# Patient Record
Sex: FEMALE | URBAN - METROPOLITAN AREA
[De-identification: names, ages, dates, MRNs, and addresses within clinical notes are randomized per-mention and may not be internally consistent; named-entity substitution may affect disease eponyms.]

---

## 2019-03-05 ENCOUNTER — APPOINTMENT (RX ONLY)
Age: 24
Setting detail: DERMATOLOGY
End: 2019-03-05

## 2019-03-05 DIAGNOSIS — L20.89 OTHER ATOPIC DERMATITIS: ICD-10-CM

## 2019-03-05 DIAGNOSIS — L70.0 ACNE VULGARIS: ICD-10-CM

## 2019-03-05 PROCEDURE — 99203 OFFICE O/P NEW LOW 30 MIN: CPT

## 2019-03-05 PROCEDURE — ? OTHER

## 2019-03-05 PROCEDURE — ? COUNSELING

## 2019-03-05 PROCEDURE — ? PRESCRIPTION

## 2019-03-05 PROCEDURE — ? DIAGNOSIS COMMENT

## 2019-03-05 RX ORDER — CLOBETASOL PROPIONATE 0.5 MG/ML
SOLUTION TOPICAL
Qty: 1 | Refills: 1 | Status: ERX | COMMUNITY
Start: 2019-03-05

## 2019-03-05 RX ORDER — BETAMETHASONE DIPROPIONATE 0.5 MG/G
CREAM TOPICAL
Qty: 1 | Refills: 1 | Status: ERX | COMMUNITY
Start: 2019-03-05

## 2019-03-05 RX ORDER — TACROLIMUS 1 MG/G
OINTMENT TOPICAL
Qty: 1 | Refills: 3 | Status: ERX | COMMUNITY
Start: 2019-03-05

## 2019-03-05 RX ADMIN — CLOBETASOL PROPIONATE: 0.5 SOLUTION TOPICAL at 00:00

## 2019-03-05 RX ADMIN — BETAMETHASONE DIPROPIONATE: 0.5 CREAM TOPICAL at 00:00

## 2019-03-05 RX ADMIN — TACROLIMUS: 1 OINTMENT TOPICAL at 00:00

## 2019-03-05 NOTE — HPI: RASH
What Type Of Note Output Would You Prefer (Optional)?: Standard Output
How Severe Is Your Rash?: moderate
Is This A New Presentation, Or A Follow-Up?: Rash
Additional History: She did have the Nexplanon removed in October and switched to OCPs but does not think this triggered her rash. Her PCP had her on prednisone, Clobetasol, ciclopirox and betamethasone valerate.  She reports none of these were helpful.

## 2019-03-05 NOTE — PROCEDURE: DIAGNOSIS COMMENT
Comment: She has eczematous plaques on the trunk and extremities. The more lichenified plaques are on the hands. She has a plaque on each cheek, for which we will start Protopic.
Detail Level: Simple
Comment: She has acne on face, in addition to a plaque of eczema on each cheek. Will use Protopic on face, so as to not thin skin or flare acne. No specific acne treatment was started today.

## 2019-03-05 NOTE — PROCEDURE: OTHER
Detail Level: Zone
Note Text (......Xxx Chief Complaint.): This diagnosis correlates with the
Other (Free Text): - Start bleach baths 3x/week, handout given with diluting instructions.\\n- Use the clobetasol mixed in CeraVe all over 2x/day for 2 weeks, then every other day x 2 weeks\\n- limit soap to armpits and groin areas.  \\n- moisturize with cream while body is still a little damp.\\n- use augmented betamethasone to the worst affected areas like the hands and arms. \\n- ok to use Protopic on face, this is not a steroid so will not thin the skin.\\n- follow-up in one month, sooner if needed.

## 2019-04-04 ENCOUNTER — APPOINTMENT (RX ONLY)
Age: 24
Setting detail: DERMATOLOGY
End: 2019-04-04

## 2019-04-04 DIAGNOSIS — L20.89 OTHER ATOPIC DERMATITIS: ICD-10-CM

## 2019-04-04 PROBLEM — L85.3 XEROSIS CUTIS: Status: ACTIVE | Noted: 2019-04-04

## 2019-04-04 PROBLEM — L20.84 INTRINSIC (ALLERGIC) ECZEMA: Status: ACTIVE | Noted: 2019-04-04

## 2019-04-04 PROBLEM — L70.0 ACNE VULGARIS: Status: ACTIVE | Noted: 2019-04-04

## 2019-04-04 PROCEDURE — 99213 OFFICE O/P EST LOW 20 MIN: CPT

## 2019-04-04 PROCEDURE — ? DIAGNOSIS COMMENT

## 2019-04-04 NOTE — PROCEDURE: DIAGNOSIS COMMENT
Comment: much better. Some areas are only post-inflammatory hyperpimentation, while some of the plaques are still active (on hands.)\\n--start plain CeraVe all over daily\\n--decreased clobetasol mixed in CeraVe to as needed and betamethasone to worst areas as needed\\n--ok to continue bleach bathes as needed\\n--will check with her insurance company to see if Protopic is covered for her. Her face is much better, but it would be good to have a medication that is safe for use on the face and that will not flare her acne.\\n--f/u in 3 months, sooner if needed. If mostly clear, she could push the f/u out farther.
Detail Level: Zone

## 2019-04-04 NOTE — HPI: FREE FORM (FOLLOW UP HISTORY)
How Severe Is Your Skin Condition?: mild
What Brings You In Today For Follow Up? (This Is An Xx Year Old Patient Who Is Following Up For:): atopic dermatitis
Where On Your Body Is It? (Located On:): body throughout
What Was It Treated With? (The Condition Was Treated With:): topical steroids, bleach bathes
Did You Use The Treatment As Prescribed Or Directed? (The Patient Used The Treatment As Prescribed Or Directed): yes, but was not able to  the Protopic for her face. Face still improved, however.
Since The Treatment Is Your Condition Better, Worse, Or Unchanged? (Since The Treatment, The Condition Is:): much better. Many areas have resolved. Some are persistent and flare if she decreased the topical steroids. She though the bleach bathes were helpful. She did these 2-3x/week.

## 2019-04-16 ENCOUNTER — APPOINTMENT (RX ONLY)
Age: 24
Setting detail: DERMATOLOGY
End: 2019-04-16

## 2019-04-16 DIAGNOSIS — L20.89 OTHER ATOPIC DERMATITIS: ICD-10-CM

## 2019-04-16 PROCEDURE — ? PRESCRIPTION

## 2019-04-16 RX ORDER — PIMECROLIMUS 10 MG/G
CREAM TOPICAL
Qty: 1 | Refills: 1 | Status: ACTIVE